# Patient Record
Sex: MALE | Race: BLACK OR AFRICAN AMERICAN
[De-identification: names, ages, dates, MRNs, and addresses within clinical notes are randomized per-mention and may not be internally consistent; named-entity substitution may affect disease eponyms.]

---

## 2018-02-01 ENCOUNTER — HOSPITAL ENCOUNTER (INPATIENT)
Dept: HOSPITAL 74 - JSAMEDAYSX | Age: 43
LOS: 4 days | Discharge: HOME | DRG: 620 | End: 2018-02-05
Attending: SURGERY | Admitting: SURGERY
Payer: COMMERCIAL

## 2018-02-01 VITALS — BODY MASS INDEX: 47.9 KG/M2

## 2018-02-01 DIAGNOSIS — K66.0: ICD-10-CM

## 2018-02-01 DIAGNOSIS — I11.9: ICD-10-CM

## 2018-02-01 DIAGNOSIS — E66.01: Primary | ICD-10-CM

## 2018-02-01 DIAGNOSIS — I48.91: ICD-10-CM

## 2018-02-01 DIAGNOSIS — I10: ICD-10-CM

## 2018-02-01 DIAGNOSIS — I48.92: ICD-10-CM

## 2018-02-01 DIAGNOSIS — G47.33: ICD-10-CM

## 2018-02-01 DIAGNOSIS — I49.8: ICD-10-CM

## 2018-02-01 DIAGNOSIS — K21.9: ICD-10-CM

## 2018-02-01 LAB
ALBUMIN SERPL-MCNC: 3.6 G/DL (ref 3.4–5)
ALP SERPL-CCNC: 63 U/L (ref 45–117)
ALT SERPL-CCNC: 38 U/L (ref 12–78)
ANION GAP SERPL CALC-SCNC: 8 MMOL/L (ref 8–16)
AST SERPL-CCNC: 33 U/L (ref 15–37)
BILIRUB SERPL-MCNC: 0.8 MG/DL (ref 0.2–1)
BUN SERPL-MCNC: 18 MG/DL (ref 7–18)
CALCIUM SERPL-MCNC: 8.7 MG/DL (ref 8.5–10.1)
CHLORIDE SERPL-SCNC: 100 MMOL/L (ref 98–107)
CO2 SERPL-SCNC: 28 MMOL/L (ref 21–32)
CREAT SERPL-MCNC: 1.3 MG/DL (ref 0.7–1.3)
DEPRECATED RDW RBC AUTO: 13.7 % (ref 11.9–15.9)
GLUCOSE SERPL-MCNC: 147 MG/DL (ref 74–106)
HCT VFR BLD CALC: 46.9 % (ref 35.4–49)
HGB BLD-MCNC: 15.5 GM/DL (ref 11.7–16.9)
MCH RBC QN AUTO: 30.8 PG (ref 25.7–33.7)
MCHC RBC AUTO-ENTMCNC: 33.1 G/DL (ref 32–35.9)
MCV RBC: 93.1 FL (ref 80–96)
PLATELET # BLD AUTO: 171 K/MM3 (ref 134–434)
PMV BLD: 9.2 FL (ref 7.5–11.1)
POTASSIUM SERPLBLD-SCNC: 3.9 MMOL/L (ref 3.5–5.1)
PROT SERPL-MCNC: 6.9 G/DL (ref 6.4–8.2)
RBC # BLD AUTO: 5.04 M/MM3 (ref 4–5.6)
SODIUM SERPL-SCNC: 136 MMOL/L (ref 136–145)
WBC # BLD AUTO: 8.4 K/MM3 (ref 4–10)

## 2018-02-01 PROCEDURE — 0DJ08ZZ INSPECTION OF UPPER INTESTINAL TRACT, VIA NATURAL OR ARTIFICIAL OPENING ENDOSCOPIC: ICD-10-PCS | Performed by: SURGERY

## 2018-02-01 PROCEDURE — 0DNW4ZZ RELEASE PERITONEUM, PERCUTANEOUS ENDOSCOPIC APPROACH: ICD-10-PCS | Performed by: SURGERY

## 2018-02-01 PROCEDURE — 0DB64Z3 EXCISION OF STOMACH, PERCUTANEOUS ENDOSCOPIC APPROACH, VERTICAL: ICD-10-PCS | Performed by: SURGERY

## 2018-02-01 RX ADMIN — METOCLOPRAMIDE SCH MG: 5 INJECTION, SOLUTION INTRAMUSCULAR; INTRAVENOUS at 13:34

## 2018-02-01 RX ADMIN — HYDROMORPHONE HYDROCHLORIDE PRN MG: 2 INJECTION, SOLUTION INTRAMUSCULAR; INTRAVENOUS; SUBCUTANEOUS at 16:19

## 2018-02-01 RX ADMIN — SODIUM CHLORIDE SCH MLS: 9 INJECTION, SOLUTION INTRAVENOUS at 14:48

## 2018-02-01 RX ADMIN — HYDROMORPHONE HYDROCHLORIDE PRN MG: 2 INJECTION, SOLUTION INTRAMUSCULAR; INTRAVENOUS; SUBCUTANEOUS at 20:56

## 2018-02-01 RX ADMIN — FAMOTIDINE SCH MLS/HR: 20 INJECTION, SOLUTION INTRAVENOUS at 21:01

## 2018-02-01 RX ADMIN — METOCLOPRAMIDE SCH MG: 5 INJECTION, SOLUTION INTRAMUSCULAR; INTRAVENOUS at 18:52

## 2018-02-01 NOTE — OP
Operative Note





- Note:


Operative Date: 02/01/18


Pre-Operative Diagnosis: Evaluate for leak/obstruction s/p sleeve gastrectomy


Operation: Upper endoscopy/EGD


Post-Operative Diagnosis: Other (No leak/obstruction)


Surgeon: Bud Argueta


Assistant: Sagar Tapia


Anesthesia: General


Specimens Removed: None


Estimated Blood Loss (mls): 0


Operative Report Dictated: Yes

## 2018-02-01 NOTE — HP
DATE OF ADMISSION:  02/01/2018

 

CHIEF COMPLAINT:  Morbid obesity.

 

HISTORY OF PRESENT ILLNESS:  The patient is a 42-year-old gentleman with a history of

morbid obesity for many years despite multiple attempts at dietary weight loss.  He

received nutritional, psychological, and cardiac evaluation and clearance prior to

undergoing elective sleeve gastrectomy surgery.

 

PAST MEDICAL HISTORY:  Significant for hypertension and GE reflux disease.

 

PAST SURGICAL HISTORY:  Significant for a Lap-Band and then removal of band and a

sleeve gastrectomy in the past.

 

REVIEW OF SYMPTOMS:

Neurologic:  Within normal limits.

Gastrointestinal:  Subjective complaint of heartburn.

Cardiovascular:  Within normal limit.

Musculoskeletal:  Within normal limit.

 

MEDICATIONS:  Include hydrochlorothiazide daily, AcipHex twice a day, quinapril

hydrochloride twice a day, and Cartia once a day.

 

ALLERGIES:  Patient has no known allergies.

 

PHYSICAL EXAMINATION:

General:  Awake, alert, obese male.

HEENT:  No masses noted.

Lungs:  Clear bilaterally.

Heart:  Regular.  Sinus rhythm.

Abdomen:  Well-healed trocar incisions.  Soft, nontender on palpation.  Positive for

obesity.

Extremities:  Within normal limits.

 

IMPRESSION:  Morbid obesity.

 

PLAN:  I will offer laparoscopic sleeve gastrectomy, possible open sleeve gastrectomy

if necessary.

 

 

GIUSEPPE VERMA M.D.

 

[id number]

DD: 02/01/2018 10:05

DT: 02/01/2018 10:39

Job #:  [id number]

## 2018-02-01 NOTE — OP
Operative Note





- Note:


Operative Date: 02/01/18


Pre-Operative Diagnosis: Morbid Obesity.  Hypertension.  GE Reflux Disease


Operation: Laparoscopic Vertical Sleeve Gastrectomy.  Laparoscopic Lysis of 

Adhesions.  Diagnostic Laparoscopy


Findings: 





Large amount of adhesions surrounding stomach laterally and posteriorally 

secondary to previous surgery.





Sleeve Gastrectomy performed with number 42 bougie in place along lesser 

curvature


Implants: none


Post-Operative Diagnosis: Same as Pre-op (Abdominal Adhesions)


Surgeon: Sagar Tapia


Assistant: Bud Argueta


Anesthesia: General


Specimens Removed: Greater curve of stomach


Estimated Blood Loss (mls): 50


Operative Report Dictated: Yes

## 2018-02-01 NOTE — OP
DATE OF OPERATION:  02/01/2018

 

PREOPERATIVE DIAGNOSES:

1.  Morbid obesity.  

2.  Hypertension.  

3.  Gastroesophageal reflux disease.    

 

POSTOPERATIVE DIAGNOSES:

1.  Morbid obesity.  

2.  Hypertension.  

3.  Gastroesophageal reflux disease.    

4.  Abdominal adhesion.

 

PROCEDURES PERFORMED:  

1.  Laparoscopic vertical sleeve gastrectomy.

2.  Laparoscopic lysis of adhesions.  

3.  Diagnostic laparoscopy.  

 

OPERATING SURGEON:  Sagar Tapia MD

 

ASSISTANT:  Bud Argueta MD

 

ANESTHESIA:  General. 

 

OPERATIVE PROCEDURE:  The patient was brought into the operating room and placed on

the OR table in the supine position.  All precautions were taken initially including

padding for the back and the feet and Venodyne boots were placed on both lower

extremities.  At that point, the abdomen was prepped and draped in the usual manner. 

 

 

A Veress needle was placed in the midline above the umbilicus and a pneumoperitoneum

was established.  At that point, a No. 5 bladeless trocar was placed in the left

upper quadrant under laparoscopic guidance and safely placed into the left upper

quadrant.  Using that as a guide, a No. 15 bladeless trocar was placed in the midline

in the supraumbilical position and a No. 5 bladeless trocar in the right upper

quadrant.  At that point, another No. 5 bladeless trocar was placed in the left upper

quadrant and the No. 5 in the left upper quadrant was now replaced with the No. 12

trocar in order to fit a larger camera.  There were adhesions noted between the

omentum and in the right upper quadrant midline near the falciform.  These were lysed

with dissection with the LigaSure device.  Once they were completely lysed, all

adhesions were free in the upper abdomen.  At this point, a Rex liver retractor

was placed in the epigastrium to retract the left lobe of the liver.  

 

The patient was then placed in 20-degree reverse Trendelenburg position. There were

noted to be adhesions from the patient's previous surgery between the undersurface of

the liver and the proximal portion of the stomach by the esophagogastric junction. 

These adhesions were lysed with sharp dissection with the laparoscopic scissor.  Care

was taken not to cause any injury to either the liver or the stomach wall.  Once this

was done, the stomach was now in better view and dissection was now going to begin

for the sleeve gastrectomy.  

 

The pylorus was noted in the distal stomach and 6 cm _____ proximally.  Here, the

operating surgeon lifted the stomach towards the anterior abdominal wall where the

assistant surgeon retracted gastrocolic ligament inferiorly.  There were adhesions on

the stomach wall from the patient's previous surgery.  The LigaSure was used to

dissect the gastrocolic ligament and the omentum off the stomach wall.  Attempts to

get into the lesser sac.  Numerous attempts were made, but were difficult and could

not be done because of the extensive scarring on the posterior wall of the stomach to

the rectal perineum from the previous surgery.  The pancreas was noted in view and

the pancreas was very gently dissected lower down as the stomach wall posteriorly was

exposed.  Dissection now continued with the LigaSure up the greater curvature

dissecting the omentum and any other vessels off of the stomach.  Again this was

tedious because of the amount of scarring both on the posterior surface and on the

lateral surface of the stomach.  In the area of the superior pole spleen, the final

attachments between the omentum and the greater curvature were removed and now the

greater curvature was now completely detached from all attachments.  

 

Attention was now directed to the posterior wall of the stomach where there continued

to be a large amount of adhesions.  The adhesions were easily dissected with blunt

dissection with the laparoscopic instruments with the operating and the assistant

surgeon and also some sharp dissection was used.  Once the stomach wall was now

completely exposed, the anesthesiologist who had placed a No. 36 bougie prior to the

case and left it in the upper stomach now advanced it to about 50 cm so that it was

in the antrum and going towards the pylorus.  At this point, a series of staples were

going to be performed in order to complete the sleeve gastrectomy procedure.  

 

The first 4 staples were black load staples initially because the stomach was

thickened not only in the antrum, but also along most of the body.  These staplers

were started 6 cm proximal to the pylorus and continued in the superior and vertical

direction along the No. 36 bougie.  This continued until in the upper part of the

stomach by the fundus where the wall was thinner purple staples were used also 6 cm

in length and also along the bougie.  Once the final staple was fired in the left

upper quadrant, the greater curvature  was now completely detached from the rest of

the stomach.  It should be noted that prior to firing the stapler, care was taken

that the posterior wall was checked, that the posterior and anterior walls were equal

and the bougie was visible and it could be felt on both the anterior and posterior

surfaces.  Also an area in the esophagogastric junction approximately 1-1.5 cm of

serosa remained on the anterior and posterior surfaces.  

 

With the greater curvature now removed from the stomach, Dr. Argueta, the Assistant

Surgeon, stepped out and performed an upper endoscopy.  The details will be described

in his procedure note, but it should be noted that he saw no evidence of bleeding

from the staple line inside and he was able to pass the endoscope all way down to the

antrum and towards the pylorus showing that there was no signs of obstruction. 

Saline was placed around the staple line and when he blew air into the stomach and

distended, no signs of the bubbles and therefore no signs of loose staples.  

 

At this point, first the specimen removed through the No. 15 trocar site in the

middle and sent off the field as a specimen to Pathology.  The No. 15 trocar site

then was closed with the Endo Closure Device to prevent internal hernia and to

prevent bleeding.  Under direct vision, all trocars were removed and pneumoperitoneum

was released.  

 

All trocars sites _____ 0.25% Marcaine. They were all closed with 4-0 Biosyn in

subcuticular fashion.  The No. 15 trocar site in the midline was first closed with

3-0 Vicryl in the subcutaneous tissue, followed by 4-0 Biosyn in subcuticular

fashion.  Dressings were applied.  Patient awoke from anesthesia and transferred out

of the operating room to the recovery room in stable condition.  

 

Expected blood loss was 50 mL.  

 

Patient was transferred to the recovery room in stable condition.  

 

 

 

NENA [id number]

DD: 02/01/2018 13:13

DT: 02/01/2018 14:17

Job #:  [id number]

## 2018-02-02 LAB
ALBUMIN SERPL-MCNC: 3.3 G/DL (ref 3.4–5)
ALP SERPL-CCNC: 60 U/L (ref 45–117)
ALT SERPL-CCNC: 32 U/L (ref 12–78)
ANION GAP SERPL CALC-SCNC: 6 MMOL/L (ref 8–16)
AST SERPL-CCNC: 24 U/L (ref 15–37)
BILIRUB SERPL-MCNC: 0.9 MG/DL (ref 0.2–1)
BUN SERPL-MCNC: 14 MG/DL (ref 7–18)
CALCIUM SERPL-MCNC: 8.5 MG/DL (ref 8.5–10.1)
CHLORIDE SERPL-SCNC: 104 MMOL/L (ref 98–107)
CO2 SERPL-SCNC: 31 MMOL/L (ref 21–32)
CREAT SERPL-MCNC: 1.1 MG/DL (ref 0.7–1.3)
DEPRECATED RDW RBC AUTO: 13.3 % (ref 11.9–15.9)
GLUCOSE SERPL-MCNC: 106 MG/DL (ref 74–106)
HCT VFR BLD CALC: 45.5 % (ref 35.4–49)
HGB BLD-MCNC: 15.1 GM/DL (ref 11.7–16.9)
MCH RBC QN AUTO: 31 PG (ref 25.7–33.7)
MCHC RBC AUTO-ENTMCNC: 33.1 G/DL (ref 32–35.9)
MCV RBC: 93.6 FL (ref 80–96)
PLATELET # BLD AUTO: 178 K/MM3 (ref 134–434)
PMV BLD: 9.3 FL (ref 7.5–11.1)
POTASSIUM SERPLBLD-SCNC: 4.5 MMOL/L (ref 3.5–5.1)
PROT SERPL-MCNC: 6.7 G/DL (ref 6.4–8.2)
RBC # BLD AUTO: 4.86 M/MM3 (ref 4–5.6)
SODIUM SERPL-SCNC: 141 MMOL/L (ref 136–145)
WBC # BLD AUTO: 8.2 K/MM3 (ref 4–10)

## 2018-02-02 RX ADMIN — METOCLOPRAMIDE SCH MG: 5 INJECTION, SOLUTION INTRAMUSCULAR; INTRAVENOUS at 18:18

## 2018-02-02 RX ADMIN — FAMOTIDINE SCH MLS/HR: 20 INJECTION, SOLUTION INTRAVENOUS at 11:02

## 2018-02-02 RX ADMIN — METOCLOPRAMIDE SCH MG: 5 INJECTION, SOLUTION INTRAMUSCULAR; INTRAVENOUS at 02:29

## 2018-02-02 RX ADMIN — METOCLOPRAMIDE SCH MG: 5 INJECTION, SOLUTION INTRAMUSCULAR; INTRAVENOUS at 13:23

## 2018-02-02 RX ADMIN — SODIUM CHLORIDE SCH MLS/HR: 9 INJECTION, SOLUTION INTRAVENOUS at 13:22

## 2018-02-02 RX ADMIN — METOCLOPRAMIDE SCH MG: 5 INJECTION, SOLUTION INTRAMUSCULAR; INTRAVENOUS at 06:21

## 2018-02-02 RX ADMIN — HYDROMORPHONE HYDROCHLORIDE PRN MG: 2 INJECTION, SOLUTION INTRAMUSCULAR; INTRAVENOUS; SUBCUTANEOUS at 02:48

## 2018-02-02 RX ADMIN — FAMOTIDINE SCH MLS/HR: 20 INJECTION, SOLUTION INTRAVENOUS at 21:35

## 2018-02-02 RX ADMIN — HYDROMORPHONE HYDROCHLORIDE PRN MG: 2 INJECTION, SOLUTION INTRAMUSCULAR; INTRAVENOUS; SUBCUTANEOUS at 07:04

## 2018-02-02 RX ADMIN — SODIUM CHLORIDE SCH MLS/HR: 9 INJECTION, SOLUTION INTRAVENOUS at 21:36

## 2018-02-02 RX ADMIN — SODIUM CHLORIDE SCH MLS/HR: 9 INJECTION, SOLUTION INTRAVENOUS at 06:17

## 2018-02-02 RX ADMIN — ENOXAPARIN SODIUM SCH MG: 40 INJECTION SUBCUTANEOUS at 21:35

## 2018-02-02 RX ADMIN — SODIUM CHLORIDE SCH MLS/HR: 9 INJECTION, SOLUTION INTRAVENOUS at 02:53

## 2018-02-02 RX ADMIN — ENOXAPARIN SODIUM SCH MG: 40 INJECTION SUBCUTANEOUS at 11:02

## 2018-02-02 RX ADMIN — SODIUM CHLORIDE SCH MLS/HR: 9 INJECTION, SOLUTION INTRAVENOUS at 16:19

## 2018-02-02 NOTE — PN
Progress Note (short form)





- Note


Progress Note: 





POD#1


Afebrile; VSS


P-70-80


BP-131/78





Pt doing well


No N/V


Palak PO clear liquids- 2 oz PO TID





P/E- Abd- All trocar sites clean, dry


              no cellulitis, no erythema





WBC-8.2


H/H-15.1/45.5





UGI-no extravasation, no obstruction


Contrast delayed in fundus, but passes thru





P- PO clear liquids- 2 oz po tid


    Cont DVT prophylaxis


    OOB-ambulate

## 2018-02-02 NOTE — PATH
Surgical Pathology Report



Patient Name:  LINDA NEWBY

Accession #:  

[facility]. Rec. #:  [id number]                                                        

   /Age/Gender:  1975 (Age: 42) / M

Account:  [id number]                                                          

             Location: 4  PEDS/ADOL

Taken:  2018

Received:  2018

Reported:  2018

Physicians:  Sagar Tapia M.D.

  



Specimen(s) Received

 GREATER CURVATURE STOMACH 





Clinical History

Morbid obesity







Final Diagnosis

STOMACH, GREATER CURVATURE, LAPAROSCOPIC VERTICAL SLEEVE GASTRECTOMY:

PORTION OF STOMACH WITH MILD CHRONIC GASTRITIS. IMMUNOHISTOCHEMICAL STAIN FOR H.

PYLORI IS NEGATIVE.





***Electronically Signed***

Bernice Carbajal M.D.





Gross Description

Received in formalin, labeled "greater curvature of stomach," is a 70 gram, 16.5

x 3.0 x 2.8 cm. portion of stomach with a stapled margin of resection. The

serosa is tan-gray with minimal attached fat. The mucosa is tan-pink with normal

folds. No mucosal masses are identified. Representative sections are submitted

in one cassette.

/2018



[facility]

## 2018-02-02 NOTE — OP
DATE OF OPERATION:  02/01/2018

 

SURGEON:  Cain Argueta MD

 

PREOPERATIVE DIAGNOSIS:  Rule out leaks/obstruction after sleeve gastrectomy.  

 

POSTOPERATIVE DIAGNOSIS:  No leak/obstruction. 

 

PROCEDURE:  Upper endoscopy/esophagogastroduodenoscopy.  .

 

SPECIMEN:  None.  

 

ESTIMATED BLOOD LOSS:  None.

 

ANESTHESIA:  GET.

 

REASONS/PROCEDURE:  This is a 42-year-old gentleman who was undergoing a laparoscopic

vertical sleeve gastrectomy with Dr. Sagar Tapia.  In order to evaluate for

leak/obstruction and to evaluate the staple line, an upper endoscopy was requested,

and the endoscope was inserted into the patients mouth.  The entirety of the

esophagus, GE junction, gastric patch, and staple line were inspected.  Hemostasis

was noted.  No leak or obstruction was noted.  The stomach was fully suctioned, and

the endoscope removed.  The remainder of the procedure was continued.  

 

 

CAIN ARUGETA M.D.

 

[id number]

DD: 02/01/2018 17:47

DT: 02/02/2018 06:58

Job #:  [id number]

## 2018-02-02 NOTE — PN
Progress Note (short form)





- Note


Progress Note: 





Anesthesia postop note





41 y/o m s/p GA for laparoscopic gastric sleeve


POD#!, vss, aaox3, no complaints.


No anesthesia complications.

## 2018-02-03 LAB
ALBUMIN SERPL-MCNC: 3.8 G/DL (ref 3.4–5)
ALP SERPL-CCNC: 63 U/L (ref 45–117)
ALT SERPL-CCNC: 42 U/L (ref 12–78)
ANION GAP SERPL CALC-SCNC: 6 MMOL/L (ref 8–16)
AST SERPL-CCNC: 31 U/L (ref 15–37)
BILIRUB SERPL-MCNC: 1 MG/DL (ref 0.2–1)
BUN SERPL-MCNC: 13 MG/DL (ref 7–18)
CALCIUM SERPL-MCNC: 8.6 MG/DL (ref 8.5–10.1)
CHLORIDE SERPL-SCNC: 103 MMOL/L (ref 98–107)
CO2 SERPL-SCNC: 29 MMOL/L (ref 21–32)
CREAT SERPL-MCNC: 1 MG/DL (ref 0.7–1.3)
DEPRECATED RDW RBC AUTO: 13.4 % (ref 11.9–15.9)
GLUCOSE SERPL-MCNC: 81 MG/DL (ref 74–106)
HCT VFR BLD CALC: 47.5 % (ref 35.4–49)
HGB BLD-MCNC: 15.6 GM/DL (ref 11.7–16.9)
MCH RBC QN AUTO: 30.9 PG (ref 25.7–33.7)
MCHC RBC AUTO-ENTMCNC: 32.8 G/DL (ref 32–35.9)
MCV RBC: 94.2 FL (ref 80–96)
PLATELET # BLD AUTO: 171 K/MM3 (ref 134–434)
PMV BLD: 9.1 FL (ref 7.5–11.1)
POTASSIUM SERPLBLD-SCNC: 3.9 MMOL/L (ref 3.5–5.1)
PROT SERPL-MCNC: 7.3 G/DL (ref 6.4–8.2)
RBC # BLD AUTO: 5.04 M/MM3 (ref 4–5.6)
SODIUM SERPL-SCNC: 138 MMOL/L (ref 136–145)
WBC # BLD AUTO: 6.8 K/MM3 (ref 4–10)

## 2018-02-03 RX ADMIN — METOCLOPRAMIDE SCH MG: 10 TABLET ORAL at 21:37

## 2018-02-03 RX ADMIN — METOCLOPRAMIDE SCH MG: 5 INJECTION, SOLUTION INTRAMUSCULAR; INTRAVENOUS at 01:25

## 2018-02-03 RX ADMIN — FAMOTIDINE SCH MLS/HR: 20 INJECTION, SOLUTION INTRAVENOUS at 09:37

## 2018-02-03 RX ADMIN — METOCLOPRAMIDE SCH MG: 5 INJECTION, SOLUTION INTRAMUSCULAR; INTRAVENOUS at 06:10

## 2018-02-03 RX ADMIN — SODIUM CHLORIDE SCH: 9 INJECTION, SOLUTION INTRAVENOUS at 21:22

## 2018-02-03 RX ADMIN — RANITIDINE SCH MG: 150 TABLET ORAL at 21:37

## 2018-02-03 RX ADMIN — METOPROLOL TARTRATE SCH MG: 25 TABLET, FILM COATED ORAL at 21:37

## 2018-02-03 RX ADMIN — METOCLOPRAMIDE SCH: 5 INJECTION, SOLUTION INTRAMUSCULAR; INTRAVENOUS at 21:22

## 2018-02-03 RX ADMIN — RIVAROXABAN SCH MG: 20 TABLET, FILM COATED ORAL at 13:28

## 2018-02-03 RX ADMIN — ENOXAPARIN SODIUM SCH MG: 40 INJECTION SUBCUTANEOUS at 09:37

## 2018-02-03 RX ADMIN — METOCLOPRAMIDE SCH MG: 5 INJECTION, SOLUTION INTRAMUSCULAR; INTRAVENOUS at 13:21

## 2018-02-03 NOTE — EKG
Test Reason : 

Blood Pressure : ***/*** mmHG

Vent. Rate : 160 BPM     Atrial Rate : 153 BPM

   P-R Int : 000 ms          QRS Dur : 088 ms

    QT Int : 298 ms       P-R-T Axes : 000 -48 021 degrees

   QTc Int : 486 ms

 

ATRIAL FIBRILLATION WITH RAPID VENTRICULAR RESPONSE WITH PREMATURE

VENTRICULAR OR ABERRANTLY CONDUCTED COMPLEXES

LEFT ANTERIOR FASCICULAR BLOCK

NONSPECIFIC ST ABNORMALITY

ABNORMAL ECG

WHEN COMPARED WITH ECG OF 21-DEC-2017 09:13,

ATRIAL FIBRILLATION HAS REPLACED SINUS RHYTHM

VENT. RATE HAS INCREASED BY  94 BPM

T WAVE INVERSION NO LONGER EVIDENT IN INFERIOR LEADS

CLINICAL CORRELATION IS RECOMMENDED

Confirmed by VERENICE JON, KG ([id number]) on 2/3/2018 12:57:38 PM

 

Referred By: Sagar Tapia           Confirmed By:KG CALDERA MD

## 2018-02-03 NOTE — CONS
DATE OF CONSULTATION:  02/03/2018

 

REQUESTING PHYSICIAN:  Sagar Tapia MD

 

CHIEF COMPLAINT:  Evaluation of asymptomatic cardiac arrhythmia.

 

HISTORY OF PRESENT ILLNESS:  History was obtained from the patient.  A 42-year-old,

morbidly obese male of  descent with known history of paroxysmal

atrial fibrillation, currently on no anticoagulation therapy, CHADS2-VASc score of 1,

hypertensive cardiovascular disease, left ventricular cavity dilatation with

questionable diastolic left ventricular dysfunction, and class 0 [place] Heart

Association classification left ventricular failure, morbid obesity post gastric

banding, post revision, who was admitted electively for purpose of gastric sleeve

surgery which was performed February 1, 2018.  Today, patient was scheduled to be

discharged home, and upon ambulation, developed sudden onset of tachyarrhythmia. 

Electrocardiogram was performed which revealed evidence of paroxysmal atrial

flutter/atrial fibrillation with rapid ventricular response.  Patient currently is

seated in a chair and denies any palpitations or dizziness.  Patient does not report

any prior history of chest discomfort.  Patient denies any dyspnea, orthopnea,

paroxysmal nocturnal dyspnea, or peripheral edema.  Patient stated that in October or

November 2017, he was diagnosed with paroxysmal atrial fibrillation.  At which point,

he was initiated on Xarelto therapy which was subsequently discontinued since sinus

rhythm was regained spontaneously.

 

PAST MEDICAL HISTORY:  Paroxysmal atrial fibrillation, CHADS2-VASc score of 1,

currently on no anticoagulation therapy; hypertensive cardiovascular disease; left

ventricular cavity dilatation with questionable diastolic left ventricular

dysfunction; morbid obesity post gastric banding, post revision, post gastric sleeve

surgery.

 

SOCIAL HISTORY:  Denies tobacco abuse.

 

FAMILY HISTORY:  No family history of premature coronary artery disease or history of

cardiac arrhythmia.

 

ALLERGIES:  None reported.

 

MEDICAL THERAPY:  At home included Cardizem  mg once a day, hydrochlorothiazide

12.5 mg once a day, Accupril 40 mg twice a day, AcipHex 20 mg twice a day.

 

REVIEW OF SYSTEMS:

Head and Neck:  Denies headache, photophobia, blurring of vision.

Respiratory:  No cough or sputum production.

Cardiovascular:  As noted above.

Gastrointestinal:  Denies nausea, vomiting, diarrhea, abdominal discomfort.

Genitourinary:  No symptoms reported.

Musculoskeletal:  No symptoms reported.

 

PHYSICAL EXAMINATION:

Vital Signs:  Blood pressure is 141/88 mmHg, pulse rate currently is 140 beats per

minute, temperature 98.3.

Head and Neck:  Pupils equal, reactive to light and accommodation.  Extraocular

muscles are intact.  Anicteric sclerae.  Negative JVD.  No bruit appreciated.

Chest:  Clear to auscultation and percussion.

Cardiovascular:  S1, S2.  Irregularly irregular.  No murmur, clicks, or gallops.

Abdomen:  Soft, benign.  Normoactive bowel sounds.

Extremities:  Negative edema.  Intact distal pulses.  No calf tenderness.

 

DIAGNOSTIC DATA:  Electrocardiogram as noted above reveals atrial flutter/atrial

fibrillation with rapid ventricular response.

 

CBC revealed white cell count 6.8, hemoglobin 15.6, platelet count 171.  Basic

metabolic profile revealed sodium 138, potassium 3.9, BUN 13, creatinine 1.0, glucose

81, with normal liver profile.

 

ASSESSMENT:

1.  Paroxysmal atrial flutter/atrial fibrillation, CHADS2-VASc score of 1, recurrent

asymptomatic arrhythmia, possible vagal mediation, left atrial measurement of 4.4 cm

on echocardiography performed November 2017.

2.  Left ventricular cavity dilatation with probable diastolic left ventricular

dysfunction and class 0 New [place] Heart Association classification left ventricular

failure.

3.  Hypertensive cardiovascular disease.

4.  Post laparoscopic vertical sleeve gastrectomy, laparoscopic lysis of adhesions.

5.  History of gastric banding, post revision.

6.  History of gastroesophageal reflux disease.

7.  Morbid obesity.

 

RECOMMENDATION:

1.  Initiation of beta-blocker therapy with Lopressor.  IV Lopressor to be

administered as needed for rate control.

2.  IV Cardizem to assist with rate control and discontinue p.o. Cardizem.

3.  Continuation of Accupril therapy.

4.  Initiation of anticoagulation therapy at this point despite patient's CHADS2-VASc

score of 1 since it is recurrent and asymptomatic unless re-initiation is absolutely

contraindicated.

5.  Repeat echocardiography for evaluation of left ventricular size and function and

left atrial measurement.

 

Above was discussed in detail with patient's primary cardiologist, [facility],

Dr. KATEY Weber.

 

Thank you for the kind referral.

 

 

KG CALDERA M.D.

 

[id number]

DD: 02/03/2018 12:44

DT: 02/03/2018 13:13

Job #:  [id number]

## 2018-02-03 NOTE — PN
Progress Note (short form)





- Note


Progress Note: 





POD#2





Afebrile


Was called at 10:00 AM for pt with very rapid heart rate (130-190)


Pt with history of atrial fibrillation


Pt was ambulating and was not symptomatic


BP remained stable





Card consult answered by Dr Abbott





Presently pulse 100-115, still irregular





Pt tolerating PO- 3 OZ PO TID


No N/V





WBC-6.8 (decreased)


H/H-15.6/47.5 (stable





P - Arrythmia management as per Cardiology


    Will hold D/C home


    Clear liquids- 3 OZ PO TID


    Continue DVT prophylaxis

## 2018-02-03 NOTE — PN
Progress Note (short form)





- Note


Progress Note: 


Chief Complaint: Events noted, notes reviewed, denies any chest discomfort or 

dyspnea, asymptomatic atrial flutter/atrial fibrillation





History of Present Illness: 


Seen and examined on telemetry. Full consult dictated





Echocardiography performed recently November 2017 revealed normal LV function 

with LVEF of 57%, LA dilatation 4.4 cm, trace/mild MR and TR





MPI study performed recently November 2017 revealed no ischemia with dilated LV 

and LVEF of 49%





- Current Medication List


 Current Medications





Diltiazem HCl (Cardizem Cd -)  240 mg PO DAILY [facility]


   Last Admin: 02/03/18 10:34 Dose:  240 mg


Enoxaparin Sodium (Lovenox -)  40 mg SQ BID@0800,2000 [facility]


   Last Admin: 02/03/18 09:37 Dose:  40 mg


Hydrochlorothiazide (Hctz -)  12.5 mg PO DAILY [facility]


   Last Admin: 02/03/18 10:34 Dose:  12.5 mg


Hydromorphone HCl (Dilaudid Injection -)  0.5 mg IVPB Q4H PRN


   PRN Reason: PAIN


   Last Admin: 02/02/18 07:04 Dose:  0.5 mg


Famotidine/Sodium Chloride (Pepcid 20 Mg Premixed Ivpb -)  20 mg in 50 mls @ 

100 mls/hr IVPB BID [facility]


   Last Admin: 02/03/18 09:37 Dose:  100 mls/hr


Sodium Chloride (Normal Saline -)  1,000 mls @ 50 mls/hr IV ASDIR [facility]


   Last Admin: 02/02/18 21:36 Dose:  50 mls/hr


Metoclopramide HCl (Reglan Injection -)  10 mg IVPUSH Q6H [facility]


   Last Admin: 02/03/18 06:10 Dose:  10 mg


Ondansetron HCl (Zofran Injection)  4 mg IVPUSH Q6H PRN


   PRN Reason: NAUSEA AND/OR VOMITING


Oxycodone HCl (Roxicodone -)  5 mg PO Q4H PRN


   PRN Reason: PAIN SCALE 5-10


   Last Admin: 02/02/18 21:36 Dose:  5 mg


Quinapril HCl (Accupril -)  40 mg PO BID [facility]


   Last Admin: 02/03/18 10:34 Dose:  40 mg





Review of Systems





Cardiovascular: As noted above


Respiratory: denies: denies: Cough or Sputum Production


Gastrointestinal: denies: Nausea, Vomiting, Diarrhea, Constipation or Abdominal 

Discomfort    


Musculoskeletal: No Symptoms Reported


Endocrine: No Symptoms Reported





- Objective


Vital Signs: 


 Last Vital Signs











Temp Pulse Resp BP Pulse Ox


 


 98.3 F   64   16   141/88   98 


 


 02/03/18 06:00  02/03/18 06:00  02/03/18 06:00  02/03/18 06:00  02/02/18 21:00








 Intake & Output











 01/31/18 02/01/18 02/02/18 02/03/18





 23:59 23:59 23:59 23:59


 


Intake Total  2950 3650 609


 


Output Total  2305 1600 300


 


Balance  645 2050 309


 


Weight   366 lb 6 oz 363 lb 6.4 oz











Constitutional: No Distress, Awake, Alert and Oriented


Respiratory: Scattered Rhonchi Bilaterally


Cardiovascular: S1 S2 Irregularly Irregular No Murmurs Clicks or Gallops


Gastrointestinal: Soft Benign Normal Bowel Sounds


Ext: No Edema Intact Distal Pulses No Calf Tenderness





Labs: 


 CBC, BMP





 02/03/18 10:40 





 02/03/18 10:40 





 Hepatic Panel











Total Bilirubin  1.0 mg/dL (0.2-1.0)   02/03/18  10:40    


 


AST  31 U/L (15-37)  D 02/03/18  10:40    


 


ALT  42 U/L (12-78)  D 02/03/18  10:40    


 


Alkaline Phosphatase  63 U/L ()   02/03/18  10:40    


 


Albumin  3.8 g/dl (3.4-5.0)   02/03/18  10:40    











Assessment/Plan





Assessment: 





1. Paroxysmal atrial flutter/atrial fibrillation [id number] score of 1, 

recurrent asymptomatic arrhythmia, possible vagal mediation (LA dilatation 4.4 

cm)


2. LV dilatation with probable diastolic LV dysfunction and class 0 NYHA 

classifcation LV failure


3. HTN


4. Post Laparoscopic Vertical Sleeve Gastrectomy, Laparoscopic Lysis of 

Adhesions


5. History of gastric banding post revision


6. History of GERD


7. Morbid obesity





PLAN:





1. Initiate B-Blockers with Lopressor, IV Lopressor as needed


2. IV Cardizem to assist with rate control and D/C PO Cardizem


3. Continue Accupril


4. Initiate A/C at t his point despite [id number] score of 1 since it is 

recurrent and asymptomatic, unless re-initiation is absolutely contraindicated


5. Repeat echocardiography to evaluate LV size and function 














Yordan Espinoza M.D.

## 2018-02-04 LAB
ALBUMIN SERPL-MCNC: 3.4 G/DL (ref 3.4–5)
ALP SERPL-CCNC: 57 U/L (ref 45–117)
ALT SERPL-CCNC: 34 U/L (ref 12–78)
ANION GAP SERPL CALC-SCNC: 7 MMOL/L (ref 8–16)
AST SERPL-CCNC: 19 U/L (ref 15–37)
BILIRUB SERPL-MCNC: 1.1 MG/DL (ref 0.2–1)
BUN SERPL-MCNC: 16 MG/DL (ref 7–18)
CALCIUM SERPL-MCNC: 8.8 MG/DL (ref 8.5–10.1)
CHLORIDE SERPL-SCNC: 101 MMOL/L (ref 98–107)
CO2 SERPL-SCNC: 28 MMOL/L (ref 21–32)
CREAT SERPL-MCNC: 1.2 MG/DL (ref 0.7–1.3)
DEPRECATED RDW RBC AUTO: 13.3 % (ref 11.9–15.9)
GLUCOSE SERPL-MCNC: 78 MG/DL (ref 74–106)
HCT VFR BLD CALC: 48.2 % (ref 35.4–49)
HGB BLD-MCNC: 15.9 GM/DL (ref 11.7–16.9)
MCH RBC QN AUTO: 30.6 PG (ref 25.7–33.7)
MCHC RBC AUTO-ENTMCNC: 32.9 G/DL (ref 32–35.9)
MCV RBC: 93 FL (ref 80–96)
PLATELET # BLD AUTO: 171 K/MM3 (ref 134–434)
PMV BLD: 8.9 FL (ref 7.5–11.1)
POTASSIUM SERPLBLD-SCNC: 4.1 MMOL/L (ref 3.5–5.1)
PROT SERPL-MCNC: 7 G/DL (ref 6.4–8.2)
RBC # BLD AUTO: 5.19 M/MM3 (ref 4–5.6)
SODIUM SERPL-SCNC: 136 MMOL/L (ref 136–145)
WBC # BLD AUTO: 6 K/MM3 (ref 4–10)

## 2018-02-04 RX ADMIN — METOCLOPRAMIDE SCH MG: 10 TABLET ORAL at 23:15

## 2018-02-04 RX ADMIN — RANITIDINE SCH MG: 150 TABLET ORAL at 21:22

## 2018-02-04 RX ADMIN — METOCLOPRAMIDE SCH MG: 10 TABLET ORAL at 11:54

## 2018-02-04 RX ADMIN — RIVAROXABAN SCH MG: 20 TABLET, FILM COATED ORAL at 17:30

## 2018-02-04 RX ADMIN — QUINAPRIL SCH MG: 40 TABLET ORAL at 11:05

## 2018-02-04 RX ADMIN — METOPROLOL TARTRATE SCH MG: 25 TABLET, FILM COATED ORAL at 21:22

## 2018-02-04 RX ADMIN — METOCLOPRAMIDE SCH MG: 10 TABLET ORAL at 06:29

## 2018-02-04 RX ADMIN — METOPROLOL TARTRATE SCH MG: 25 TABLET, FILM COATED ORAL at 11:05

## 2018-02-04 RX ADMIN — RANITIDINE SCH MG: 150 TABLET ORAL at 11:04

## 2018-02-04 RX ADMIN — METOCLOPRAMIDE SCH MG: 10 TABLET ORAL at 17:30

## 2018-02-04 NOTE — EKG
Test Reason : 

Blood Pressure : ***/*** mmHG

Vent. Rate : 063 BPM     Atrial Rate : 063 BPM

   P-R Int : 144 ms          QRS Dur : 092 ms

    QT Int : 418 ms       P-R-T Axes : 037 -48 -19 degrees

   QTc Int : 427 ms

 

SINUS RHYTHM WITH PREMATURE ATRIAL COMPLEXES WITH ABERRANT CONDUCTION

POSSIBLE LEFT ATRIAL ENLARGEMENT

LEFT ANTERIOR FASCICULAR BLOCK

LEFT VENTRICULAR HYPERTROPHY

ABNORMAL ECG

WHEN COMPARED WITH ECG OF 03-FEB-2018 10:48,

SINUS RHYTHM HAS REPLACED ATRIAL FIBRILLATION

VENT. RATE HAS DECREASED BY  97 BPM

T WAVE INVERSION NOW EVIDENT IN INFERIOR LEADS

CLINICAL CORRELATION IS RECOMMENDED

Confirmed by KG CALDERA MD ([id number]) on 2/4/2018 12:15:02 PM

 

Referred By: Sagar Tapia           Confirmed By:KG CALDERA MD

## 2018-02-04 NOTE — PN
Progress Note (short form)





- Note


Progress Note: 


Chief Complaint: Events noted, notes reviewed, denies any chest discomfort or 

dyspnea, asymptomatic atrial flutter/atrial fibrillation persists with periods 

of rapid ventricular response





History of Present Illness: 


Seen and examined on telemetry. Events noted, notes reviewed, denies any chest 

discomfort or dyspnea, asymptomatic atrial flutter/atrial fibrillation persists 

with periods of rapid ventricular response





Echocardiography performed recently November 2017 revealed normal LV function 

with LVEF of 57%, LA dilatation 4.4 cm, trace/mild MR and TR





MPI study performed recently November 2017 revealed no ischemia with dilated LV 

and LVEF of 49%





- Current Medication List


 Current Medications





Hydromorphone HCl (Dilaudid Injection -)  0.5 mg IVPB Q4H PRN


   PRN Reason: PAIN


   Last Admin: 02/02/18 07:04 Dose:  0.5 mg


Metoclopramide HCl (Reglan -)  10 mg PO Q6HPO [facility]


   Last Admin: 02/04/18 06:29 Dose:  10 mg


Metoprolol Tartrate (Lopressor -)  25 mg PO BID [facility]


   Last Admin: 02/03/18 21:37 Dose:  25 mg


Ondansetron HCl (Zofran Injection)  4 mg IVPUSH Q6H PRN


   PRN Reason: NAUSEA AND/OR VOMITING


Oxycodone HCl (Roxicodone -)  5 mg PO Q4H PRN


   PRN Reason: PAIN SCALE 5-10


   Last Admin: 02/02/18 21:36 Dose:  5 mg


Quinapril HCl (Accupril -)  40 mg PO DAILY [facility]


Ranitidine HCl (Zantac -)  150 mg PO BID [facility]


   Last Admin: 02/03/18 21:37 Dose:  150 mg


Rivaroxaban (Xarelto -)  20 mg PO DAILY@1800 [facility]


   Last Admin: 02/03/18 13:28 Dose:  20 mg





Review of Systems





Cardiovascular: As noted above


Respiratory: denies: denies: Cough or Sputum Production


Gastrointestinal: denies: Nausea, Vomiting, Diarrhea, Constipation or Abdominal 

Discomfort    


Musculoskeletal: No Symptoms Reported


Endocrine: No Symptoms Reported





- Objective


Vital Signs: 


 Last Vital Signs











Temp Pulse Resp BP Pulse Ox


 


 98.8 F   166 H  20   126/74   96 


 


 02/04/18 06:00  02/04/18 07:45  02/04/18 06:00  02/04/18 07:45  02/03/18 21:00








 Intake & Output











 02/01/18 02/02/18 02/03/18 02/04/18





 23:59 23:59 23:59 23:59


 


Intake Total 2950 3650 679 


 


Output Total 2305 1600 300 


 


Balance 645 2050 379 


 


Weight  366 lb 6 oz 363 lb 6.4 oz 359 lb 12.8 oz











Constitutional: No Distress, Awake, Alert and Oriented


Respiratory: Clear to A&P Bilaterally


Cardiovascular: S1 S2 Irregularly Irregular No Murmurs Clicks or Gallops


Gastrointestinal: Soft Benign Normal Bowel Sounds


Ext: No Edema Intact Distal Pulses No Calf Tenderness





Labs: 


 CBC, BMP





 02/03/18 10:40 





 02/03/18 10:40 








Assessment/Plan





Assessment: 





1. Paroxysmal atrial flutter/atrial fibrillation [id number] score of 1, 

recurrent asymptomatic arrhythmia, possible vagal mediation (LA dilatation 4.4 

cm), persistent with periods of rapid ventricular response


2. LV dilatation with probable diastolic LV dysfunction and class 0 NYHA 

classifcation LV failure


3. HTN


4. Post Laparoscopic Vertical Sleeve Gastrectomy, Laparoscopic Lysis of 

Adhesions


5. History of gastric banding post revision


6. History of GERD


7. Morbid obesity





PLAN:





1. Continue PO Lopressor, IV Lopressor as needed for rate control


2. Continue Accupril


3. Continue A/C with Xarelto at this point despite patient's [id number] score 

of 1 since it is recurrent and asymptomatic, unless continuation of A/C is 

absolutely contraindicated


4. Repeat echocardiography to evaluate LV size and function 


5. Considering the above noted persistence of rapid atrial flutter/atrial 

fibrillation with rapid ventricular response will plan to proceed with elective 

synchronized cardioversion, since duration is less than 48 hours, no 

indications for BETSY guidance, risk, benefits and alternatives were reviewed 

with the patient agreeable to proceed with above 














Yordan Espinoza M.D.

## 2018-02-04 NOTE — PN
Progress Note (short form)





- Note


Progress Note: 





POD#3


Afebrile;VSS


Dr Espinoza note seen and appreciated





Pt with no N/V


Tolerating PO clear liquids- 3oz PO TID + sips of water





P/E- Abd- band-aids off incisions.


       All sites healing well


       No cellulitis





WBC-6.0


H/H-15.9/48.2


(all stable from previously)





BUN/CR-16/1.2 (Range of CR 1.0-1.3)





P- Continue PO clear liquids 3 oz 4-5 times per day


    Continue Cardiology management as per Dr Espinoza


    Continue present post-op care

## 2018-02-05 VITALS — TEMPERATURE: 98.5 F | SYSTOLIC BLOOD PRESSURE: 156 MMHG | HEART RATE: 62 BPM | DIASTOLIC BLOOD PRESSURE: 91 MMHG

## 2018-02-05 RX ADMIN — METOCLOPRAMIDE SCH MG: 10 TABLET ORAL at 05:14

## 2018-02-05 RX ADMIN — RANITIDINE SCH MG: 150 TABLET ORAL at 10:36

## 2018-02-05 RX ADMIN — QUINAPRIL SCH MG: 40 TABLET ORAL at 10:35

## 2018-02-05 RX ADMIN — METOPROLOL TARTRATE SCH MG: 25 TABLET, FILM COATED ORAL at 10:36

## 2018-02-05 RX ADMIN — METOCLOPRAMIDE SCH MG: 10 TABLET ORAL at 12:14

## 2018-02-05 NOTE — PN
Progress Note (short form)





- Note


Progress Note: 





POD#4





Afebrile; VSS


Carsiology clearance note appreciated





Pt OOB-in chair


No N/V





Palak PO clear liquids





All instructions regarding diet, shower, activity given to pt





P- D/C home


    F/U with Cardiologist as per their instructions


    F/U with Bariatric office 2/14/18

## 2018-02-05 NOTE — PN
Progress Note, Physician


History of Present Illness: 


No further recurrence of asymptomatic PAF post DCCV.








- Current Medication List


Current Medications: 


Active Medications





Metoclopramide HCl (Reglan -)  10 mg PO Q6HPO [facility]


   Last Admin: 02/05/18 05:14 Dose:  10 mg


Metoprolol Tartrate (Lopressor -)  25 mg PO BID [facility]


   Last Admin: 02/05/18 10:36 Dose:  25 mg


Ondansetron HCl (Zofran Injection)  4 mg IVPUSH Q6H PRN


   PRN Reason: NAUSEA AND/OR VOMITING


Oxycodone HCl (Roxicodone -)  5 mg PO Q4H PRN


   PRN Reason: PAIN SCALE 5-10


   Last Admin: 02/02/18 21:36 Dose:  5 mg


Quinapril HCl (Accupril -)  40 mg PO DAILY [facility]


   Last Admin: 02/05/18 10:35 Dose:  40 mg


Ranitidine HCl (Zantac -)  150 mg PO BID [facility]


   Last Admin: 02/05/18 10:36 Dose:  150 mg


Rivaroxaban (Xarelto -)  20 mg PO DAILY@1800 [facility]


   Last Admin: 02/04/18 17:30 Dose:  20 mg











- Objective


Vital Signs: 


 Vital Signs











Temperature  98.7 F   02/05/18 06:00


 


Pulse Rate  71   02/05/18 06:00


 


Respiratory Rate  20   02/05/18 06:00


 


Blood Pressure  118/60   02/05/18 06:00


 


O2 Sat by Pulse Oximetry (%)  100   02/04/18 21:00











Constitutional: Yes: No Distress, Calm


Neck: Yes: Supple


Cardiovascular: Yes: Regular Rate and Rhythm


Respiratory: Yes: Regular, Diminished


Gastrointestinal: Yes: Normal Bowel Sounds, Soft, Abdomen, Obese


Edema: No


Labs: 


 CBC, BMP





 02/04/18 09:00 





 02/04/18 09:00 











- ....Imaging


EKG: Report Reviewed (Tele: NSR without recurrence of PAF SR @ 62 PAC, LVH LAD)





Problem List





- Problems


(1) Paroxysmal atrial fibrillation with rapid ventricular response


Code(s): I48.0 - PAROXYSMAL ATRIAL FIBRILLATION   





(2) Hypertensive cardiopathy


Code(s): I11.9 - HYPERTENSIVE HEART DISEASE WITHOUT HEART FAILURE   


Qualifiers: 


   Heart failure presence: without heart failure   Qualified Code(s): I11.9 - 

Hypertensive heart disease without heart failure   





(3) Obstructive sleep apnea on CPAP


Code(s): G47.33 - OBSTRUCTIVE SLEEP APNEA (ADULT) (PEDIATRIC); Z99.89 - 

DEPENDENCE ON OTHER ENABLING MACHINES AND DEVICES   





(4) S/P bariatric surgery


Code(s): Z98.84 - BARIATRIC SURGERY STATUS   





(5) Diastolic dysfunction without heart failure


Code(s): I51.9 - HEART DISEASE, UNSPECIFIED   





Assessment/Plan





1. Paroxysmal atrial flutter/atrial fibrillation [id number] score of 1, 

recurrent asymptomatic arrhythmia, possible vagal mediation (LA dilatation 4.4 

cm), persistent with periods of rapid ventricular response now in sinus rhythm 

post DCCV


2. LV dilatation with probable diastolic LV dysfunction and class 0 NYHA 

classifcation LV failure


3. HTN cardiomyopathy


4. Post Laparoscopic Vertical Sleeve Gastrectomy, Laparoscopic Lysis of 

Adhesions


5. History of gastric banding post revision


6. History of GERD


7. Morbid obesity with OSAS on cpap





PLAN:





1. Continue Lopressor 25 bid


2. Continue Accupril 40 qd


3. Continue A/C with Xarelto 20 qd for 2 months duration for stunned atria post 

DCCV, consider change to antiplatelet if no recurrence on arrythmia monitoring 

as outpatient


4. Patient to f/u with Lilly Espinoza and [facility] of [facility], Susan. \[facility].\"" at [facility]


5. To continue B-Blocker therapy and if arrhythmia recurs, consideration for 

antiarrhythmic therapy and future consideration for AF pulmonary vein isolation 

RFA, above was discussed in detail with the patient


6. f/u repeat echo results, emphasized importance of compliance with cpap